# Patient Record
Sex: FEMALE | Race: WHITE | NOT HISPANIC OR LATINO | Employment: STUDENT | URBAN - METROPOLITAN AREA
[De-identification: names, ages, dates, MRNs, and addresses within clinical notes are randomized per-mention and may not be internally consistent; named-entity substitution may affect disease eponyms.]

---

## 2023-07-12 ENCOUNTER — OFFICE VISIT (OUTPATIENT)
Dept: OBSTETRICS AND GYNECOLOGY | Facility: CLINIC | Age: 23
End: 2023-07-12
Payer: OTHER GOVERNMENT

## 2023-07-12 VITALS — DIASTOLIC BLOOD PRESSURE: 70 MMHG | WEIGHT: 118.63 LBS | HEART RATE: 86 BPM | SYSTOLIC BLOOD PRESSURE: 108 MMHG

## 2023-07-12 DIAGNOSIS — R30.0 DYSURIA: ICD-10-CM

## 2023-07-12 DIAGNOSIS — N89.8 VAGINAL DISCHARGE: ICD-10-CM

## 2023-07-12 DIAGNOSIS — Z30.9 ENCOUNTER FOR CONTRACEPTIVE MANAGEMENT, UNSPECIFIED TYPE: ICD-10-CM

## 2023-07-12 DIAGNOSIS — Z01.419 WELL WOMAN EXAM WITH ROUTINE GYNECOLOGICAL EXAM: Primary | ICD-10-CM

## 2023-07-12 DIAGNOSIS — Z76.89 ENCOUNTER TO ESTABLISH CARE: ICD-10-CM

## 2023-07-12 PROCEDURE — 99385 PR PREVENTIVE VISIT,NEW,18-39: ICD-10-PCS | Mod: S$GLB,,,

## 2023-07-12 PROCEDURE — 99385 PREV VISIT NEW AGE 18-39: CPT | Mod: S$GLB,,,

## 2023-07-12 RX ORDER — ESCITALOPRAM OXALATE 5 MG/1
5 TABLET ORAL
COMMUNITY
Start: 2023-05-06

## 2023-07-12 RX ORDER — NORETHINDRONE ACETATE AND ETHINYL ESTRADIOL 1MG-20(21)
1 KIT ORAL DAILY
Qty: 30 TABLET | Refills: 11 | Status: SHIPPED | OUTPATIENT
Start: 2023-07-12 | End: 2024-07-11

## 2023-07-12 RX ORDER — FLUCONAZOLE 150 MG/1
150 TABLET ORAL EVERY OTHER DAY
Qty: 2 TABLET | Refills: 0 | Status: SHIPPED | OUTPATIENT
Start: 2023-07-12 | End: 2023-07-15

## 2023-07-12 RX ORDER — TINIDAZOLE 500 MG/1
2 TABLET ORAL
Qty: 8 TABLET | Refills: 0 | Status: SHIPPED | OUTPATIENT
Start: 2023-07-12 | End: 2023-07-14

## 2023-07-12 NOTE — PROGRESS NOTES
Subjective:      Patient ID: Foreign Rodrigues is a 22 y.o. female who presents for evaluation today.    Chief Complaint:    Well Woman, Vaginal Discharge (Pt states discharge is normal for her.), Vaginal odor, and Contraception (Pt would like to discuss birth control.)      History of Present Illness  Annual Exam (Premenopausal) - Patient presents for annual exam. The patient also has complaints of frequent UTIs, vaginal odor & discharge. The patient is sexually active. GYN screening history: no prior history of gyn screening tests. The patient wears seatbelts: yes. The patient participates in regular exercise: not asked. Has the patient ever been transfused or tattooed?: yes. The patient reports that there is not domestic violence in her life. Her periods are regular. She reports mood swings during her period & increased acne. She is on lexapro with Dr. Oconnor, doing well. She is interested in trying a birth control pill. She denies UTI symptoms today but has had frequent UTIs in the past.      GYN History  Patient's last menstrual period was 07/08/2023 (exact date).   Date of Last Pap: Pap smear completed today with screening for gonorrhea, chlamydia and trichomoniasis per CDC guidelines    VITALS  /70   Pulse 86   Wt 53.8 kg (118 lb 9.6 oz)   LMP 07/08/2023 (Exact Date)   Weight: 53.8 kg (118 lb 9.6 oz)         PAST MEDICAL HISTORY  Past Medical History:   Diagnosis Date    Anemia     Anxiety     Depression     Hormone disorder        PAST SURGICAL HISTORY  History reviewed. No pertinent surgical history.    SOCIAL HISTORY  Social History     Tobacco Use   Smoking Status Never   Smokeless Tobacco Never   ,   Social History     Substance and Sexual Activity   Alcohol Use Yes    Alcohol/week: 2.0 standard drinks    Types: 2 Glasses of wine per week        MEDICATIONS  No outpatient medications have been marked as taking for the 7/12/23 encounter (Office Visit) with Alisha Tan NP.         Review  of Systems   Review of Systems   Constitutional:  Negative for activity change, chills and fever.   Eyes:  Negative for visual disturbance.   Respiratory:  Negative for shortness of breath.    Cardiovascular:  Negative for chest pain.   Gastrointestinal:  Negative for abdominal pain, constipation, diarrhea, nausea and vomiting.   Genitourinary:  Positive for vaginal discharge. Negative for dysuria, flank pain, frequency, hematuria, menorrhagia, pelvic pain, urgency and vaginal bleeding.        No abnormal vaginal bleeding   Musculoskeletal:  Negative for back pain.   Integumentary:  Positive for acne. Negative for rash and breast mass.   Neurological:  Negative for numbness and headaches.   Psychiatric/Behavioral:          No mood disturbance or changes    Breast: Negative for mass        Objective:     Physical Exam:   Constitutional: She is oriented to person, place, and time. She appears well-developed. No distress.      Neck: No thyromegaly present.    Cardiovascular:  Normal rate and regular rhythm.             Pulmonary/Chest: Effort normal and breath sounds normal. No respiratory distress.        Abdominal: Soft. She exhibits no distension. There is no abdominal tenderness.     Genitourinary:    Vagina and uterus normal.      Pelvic exam was performed with patient supine.   The external female genitalia was normal.   Labial bartholins normal.There is no tenderness or lesion on the right labia. There is no tenderness or lesion on the left labia. Cervix is normal. Right adnexum displays no tenderness and no fullness. Left adnexum displays no tenderness and no fullness. Cervix exhibits no tenderness. Uterus is not enlarged and not tender.           Musculoskeletal: Moves all extremeties.       Neurological: She is alert and oriented to person, place, and time.    Skin: Skin is warm and dry.    Psychiatric: She has a normal mood and affect. Her behavior is normal.        Assessment:        1. Well woman exam with  routine gynecological exam    2. Encounter to establish care    3. Encounter for contraceptive management, unspecified type    4. Vaginal discharge    5. Dysuria       Well woman exam with routine gynecological exam    Encounter to establish care  -     Ambulatory referral/consult to Internal Medicine; Future; Expected date: 07/19/2023    Encounter for contraceptive management, unspecified type  -     norethindrone-ethinyl estradiol (JUNEL FE 1/20) 1 mg-20 mcg (21)/75 mg (7) per tablet; Take 1 tablet by mouth once daily.  Dispense: 30 tablet; Refill: 11  -     Cancel: POCT urine pregnancy  -     Pregnancy, urine rapid    Vaginal discharge  -     tinidazole (TINDAMAX) 500 MG tablet; Take 4 tablets (2 g total) by mouth daily with breakfast. for 2 days  Dispense: 8 tablet; Refill: 0  -     fluconazole (DIFLUCAN) 150 MG Tab; Take 1 tablet (150 mg total) by mouth every other day. for 2 doses  Dispense: 2 tablet; Refill: 0  -     BV PROFILE, SYMPTOMATIC    Dysuria  -     Cancel: POCT urine dipstick without microscope  -     Urinalysis, Reflex to Urine Culture Urine, Clean Catch; Future; Expected date: 07/12/2023       Plan:     Patient instructed to contact the clinic should any questions or concerns arise prior to her next office visit. Patient is happy with the plan of care at this time, verbalizes understanding and denies outstanding questions.      UA/UPT order sent to path lab  F/U in 3 mos for med check  Self-breast exams  Birth Control & safe sex practices discussed  If you don't hear from the office regarding results within 1 week, please call  Follow up in 1 year for annual or sooner as needed  Chaperone present for exam      The patient verbalizes desire to continue with current contraceptive regimen. The risks associated with oral contraceptive use, with special emphasis placed on the risk for stroke, blood clots, MI and death were reviewed with the patient. The patient was provided with the opportunity to ask  questions and verbalize concerns. Patient education was provided on the following aspects associated with oral contraceptive use:  Take the pill at the same time every day, set an alarm to serve as a reminder if necessary   If nausea or vomiting occurs when taking the medication, take with food or at bedtime  Missing doses may lead to breakthrough bleeding   Your periods may become lighter and/or shorter in duration than usual  If a pill is missed for one day, take 2 pills the next day  If two consecutive days are missed, take 2 pills for the next 2 days to catch up and finish the birth control pack. Utilize a backup method of contraception for the remainder of the pill pack.  Certain medications, especially antibiotics, may decrease the effectiveness of the pill  Oral contraceptives do not provide protection against sexually transmitted diseases  If any of the following symptoms are experiencing report to the nearest emergency room for further evaluation and discontinue the pill immediately.  Severe abdominal pain  Severe chest pain  Severe headache or paresthesia   Changes in vision  Severe leg pain or swelling  Shortness of breath and increased heart rate   Discussed by taking the pill, the patient consents to knowing and understanding the risks associated with oral contraceptive therapy. The patient verbalizes understanding of the components discussed and denies additional questions at this time.

## 2023-07-17 LAB
BACTERIAL VAGINOSIS: POSITIVE
CANDIDA GLABRATA: NEGATIVE
CANDIDA SPECIES: NEGATIVE
CHLAMYDIA: NEGATIVE
GONORRHEA: NEGATIVE
MYCOPLASMA GENITALIUM RESULT: NEGATIVE
TRICHOMONAS VAGINALIS: NEGATIVE

## 2023-07-18 ENCOUNTER — OFFICE VISIT (OUTPATIENT)
Dept: URGENT CARE | Facility: CLINIC | Age: 23
End: 2023-07-18
Payer: OTHER GOVERNMENT

## 2023-07-18 VITALS
WEIGHT: 118 LBS | RESPIRATION RATE: 16 BRPM | SYSTOLIC BLOOD PRESSURE: 102 MMHG | HEIGHT: 60 IN | DIASTOLIC BLOOD PRESSURE: 69 MMHG | TEMPERATURE: 99 F | HEART RATE: 69 BPM | BODY MASS INDEX: 23.16 KG/M2 | OXYGEN SATURATION: 99 %

## 2023-07-18 DIAGNOSIS — J02.9 SORE THROAT: ICD-10-CM

## 2023-07-18 DIAGNOSIS — J03.90 EXUDATIVE TONSILLITIS: Primary | ICD-10-CM

## 2023-07-18 DIAGNOSIS — J02.9 TONSILLOPHARYNGITIS: ICD-10-CM

## 2023-07-18 DIAGNOSIS — J35.1 TONSILLAR HYPERTROPHY: ICD-10-CM

## 2023-07-18 DIAGNOSIS — J03.91 ACUTE RECURRENT TONSILLITIS: ICD-10-CM

## 2023-07-18 LAB
CTP QC/QA: YES
CTP QC/QA: YES
HETEROPH AB SER QL: NEGATIVE
MOLECULAR STREP A: NEGATIVE

## 2023-07-18 PROCEDURE — 87651 STREP A DNA AMP PROBE: CPT | Mod: QW,,, | Performed by: NURSE PRACTITIONER

## 2023-07-18 PROCEDURE — 86308 HETEROPHILE ANTIBODY SCREEN: CPT | Mod: QW,,, | Performed by: NURSE PRACTITIONER

## 2023-07-18 PROCEDURE — 99499 NO LOS: ICD-10-PCS | Mod: S$GLB,,, | Performed by: NURSE PRACTITIONER

## 2023-07-18 PROCEDURE — 99499 UNLISTED E&M SERVICE: CPT | Mod: S$GLB,,, | Performed by: NURSE PRACTITIONER

## 2023-07-18 PROCEDURE — 87651 POCT STREP A MOLECULAR: ICD-10-PCS | Mod: QW,,, | Performed by: NURSE PRACTITIONER

## 2023-07-18 PROCEDURE — 86308 POCT INFECTIOUS MONONUCLEOSIS: ICD-10-PCS | Mod: QW,,, | Performed by: NURSE PRACTITIONER

## 2023-07-18 RX ORDER — PREDNISONE 20 MG/1
20 TABLET ORAL DAILY
Qty: 5 TABLET | Refills: 0 | Status: SHIPPED | OUTPATIENT
Start: 2023-07-18 | End: 2023-07-23

## 2023-07-18 RX ORDER — AMOXICILLIN 500 MG/1
500 TABLET, FILM COATED ORAL EVERY 12 HOURS
Qty: 20 TABLET | Refills: 0 | Status: SHIPPED | OUTPATIENT
Start: 2023-07-18 | End: 2023-07-28

## 2023-07-18 NOTE — PROGRESS NOTES
Subjective:      Patient ID: Foreign Rodrigues is a 22 y.o. female.    Vitals:  height is 5' (1.524 m) and weight is 53.5 kg (118 lb). Her temperature is 98.7 °F (37.1 °C). Her blood pressure is 102/69 and her pulse is 69. Her respiration is 16 and oxygen saturation is 99%.     Chief Complaint: Sore Throat    Patient complains of a sore throat, swollen/painful lymph nodes, fatigue,nausea, and decreased appetite x 1 week. She denies fever.  She reports being treated ~1 month ago for same complaint and completed 10 day course of unidentified antibiotic treatment. States her symptoms never fully resolved following completion of antibiotics and have worsened in the past week. No known exposure to strep pharyngitis or mononucleosis but does report known close contact with a friend who was recently diagnosed with a sinus infection.  She does admit to engaging in oral intercourse.  She also reports history of recurrent Strep C pharyngitis as a child.    Sore Throat   This is a new problem. The current episode started in the past 7 days. The problem has been waxing and waning. The pain is worse on the left side. There has been no fever. The pain is at a severity of 2/10. The pain is mild. Associated symptoms include congestion, coughing and swollen glands. Pertinent negatives include no abdominal pain, diarrhea, drooling, ear pain, headaches, shortness of breath, stridor, trouble swallowing or vomiting. She has had no exposure to strep or mono. Treatments tried: nyquil. The treatment provided mild relief.     Constitution: Positive for appetite change, chills and fatigue.   HENT:  Positive for congestion and sore throat. Negative for ear pain, drooling, postnasal drip, sinus pain, sinus pressure, trouble swallowing and voice change.    Neck: Positive for painful lymph nodes. Negative for neck stiffness and neck swelling.   Cardiovascular:  Negative for chest pain, palpitations and sob on exertion.   Respiratory:  Positive  for cough. Negative for shortness of breath, stridor and wheezing.         Infrequent dry cough   Gastrointestinal:  Positive for nausea. Negative for abdominal pain, vomiting and diarrhea.   Musculoskeletal:  Negative for pain, joint pain, joint swelling and abnormal ROM of joint.   Skin:  Negative for color change, pale, rash, lesion and erythema.   Neurological:  Negative for dizziness, light-headedness, passing out, headaches, disorientation and altered mental status.   Hematologic/Lymphatic: Positive for swollen lymph nodes.   Psychiatric/Behavioral:  Negative for altered mental status, disorientation, confusion and agitation.     Objective:     Physical Exam   Constitutional: She is oriented to person, place, and time.  Non-toxic appearance. She does not appear ill. No distress.   HENT:   Head: Normocephalic and atraumatic.   Ears:   Right Ear: Tympanic membrane normal.   Left Ear: Tympanic membrane normal.   Nose: Nose normal.   Mouth/Throat: Uvula is midline and mucous membranes are normal. Mucous membranes are moist. No oral lesions. No trismus in the jaw. No dental abscesses or uvula swelling. Oropharyngeal exudate and posterior oropharyngeal erythema present. No tonsillar abscesses. Tonsils are 2+ on the right. Tonsils are 3+ on the left. Tonsillar exudate.       Swallowing function intact.  No muffled voice sounds  No peritonsillar abscess is seen.      Comments: Swallowing function intact.  No muffled voice sounds  No peritonsillar abscess is seen.  Eyes: Conjunctivae are normal.   Neck: No neck rigidity present.   Cardiovascular: Normal rate, regular rhythm and normal heart sounds.   Pulmonary/Chest: Effort normal and breath sounds normal.   Abdominal: Normal appearance. Soft. flat abdomen   Musculoskeletal: Normal range of motion.         General: Normal range of motion.   Lymphadenopathy:        Head (right side): Submandibular adenopathy present.        Head (left side): Submandibular adenopathy  present.     She has cervical adenopathy.        Right cervical: Superficial cervical adenopathy present.        Left cervical: Superficial cervical adenopathy present.   + bilateral submandibular lymphadenopathy L>R   Neurological: no focal deficit. She is alert, oriented to person, place, and time and at baseline.   Skin: Skin is warm, dry, not diaphoretic, not pale and no rash. No erythema and No lesion   Psychiatric: Her behavior is normal. Mood, judgment and thought content normal.   Nursing note and vitals reviewed.    Assessment:     1. Exudative tonsillitis    2. Sore throat    3. Acute recurrent tonsillitis    4. Tonsillopharyngitis    5. Tonsillar hypertrophy        Plan:     Office Visit on 07/18/2023   Component Date Value Ref Range Status    Molecular Strep A, POC 07/18/2023 Negative  Negative Final     Acceptable 07/18/2023 Yes   Final    Monospot 07/18/2023 Negative  Negative Final     Acceptable 07/18/2023 Yes   Final       Exudative tonsillitis  -     POCT Infectious mononucleosis antibody  -     amoxicillin (AMOXIL) 500 MG Tab; Take 1 tablet (500 mg total) by mouth every 12 (twelve) hours. for 10 days  Dispense: 20 tablet; Refill: 0  -     diphenhydrAMINE-aluminum-magnesium hydroxide-simethicone-LIDOcaine HCl 2%; Swish and spit 15 mLs every 6 (six) hours as needed (throat pain).  Dispense: 180 each; Refill: 0  -     predniSONE (DELTASONE) 20 MG tablet; Take 1 tablet (20 mg total) by mouth once daily. for 5 days  Dispense: 5 tablet; Refill: 0    Sore throat  -     POCT Strep A, Molecular  -     POCT Infectious mononucleosis antibody  -     diphenhydrAMINE-aluminum-magnesium hydroxide-simethicone-LIDOcaine HCl 2%; Swish and spit 15 mLs every 6 (six) hours as needed (throat pain).  Dispense: 180 each; Refill: 0    Acute recurrent tonsillitis  -     POCT Infectious mononucleosis antibody  -     amoxicillin (AMOXIL) 500 MG Tab; Take 1 tablet (500 mg total) by mouth every 12  (twelve) hours. for 10 days  Dispense: 20 tablet; Refill: 0  -     diphenhydrAMINE-aluminum-magnesium hydroxide-simethicone-LIDOcaine HCl 2%; Swish and spit 15 mLs every 6 (six) hours as needed (throat pain).  Dispense: 180 each; Refill: 0    Tonsillopharyngitis  -     predniSONE (DELTASONE) 20 MG tablet; Take 1 tablet (20 mg total) by mouth once daily. for 5 days  Dispense: 5 tablet; Refill: 0    Tonsillar hypertrophy  -     predniSONE (DELTASONE) 20 MG tablet; Take 1 tablet (20 mg total) by mouth once daily. for 5 days  Dispense: 5 tablet; Refill: 0          Medical Decision Making:   History:   I obtained history from: someone other than patient.       <> Summary of History: Pharmacist provides health information related to pt's last antibiotic regimen which is pertinent to today's encounter and mgmt of condition.  Differential Diagnosis:   Differential Diagnosis includes, but is not limited to:  Gerry's angina, epiglottitis, foreign body aspiration, retropharyngeal abscess, peritonsillar abscess, esophageal perforation, parotitis, laryngitis, dental/periapical abscess, Streptococcal/bacterial pharyngitis, viral pharyngitis.   Clinical Tests:   Lab Tests: Reviewed       <> Summary of Lab: POCT Strep A (-)  POCT mono (-)  Urgent Care Management:  ** I called pts pharmacy to obtain information related to recent antibiotic treatment and was informed that her last Rx was written on 5/11/2023 for Amoxicillin 500 mg BID for 7 days.      The patient tested negative for strep A pharyngitis, however given her recent history of presumed strep pharyngitis and incomplete antibiotic therapy, I will discharge her home with oral antibiotics and steroids.  Discussed fever control with Motrin or Tylenol, the and to change tooth brush/avoidance of sharing eating utensils.Discussed strict ER precautions for any worsening of symptoms, they were able to verbalized understanding.      Patient Instructions   Please follow up with your  primary care provider within 2-5 days if your signs and symptoms have not resolved or worsen.     If your condition worsens or fails to improve we recommend that you receive another evaluation at the emergency room immediately or contact your primary medical clinic to discuss your concerns.   You must understand that you have received an Urgent Care treatment only and that you may be released before all of your medical problems are known or treated. You, the patient, will arrange for follow up care as instructed.      -Please take antibiotic to completion.  -Take tylenol/motrin as needed for pain/fever.    Please follow up with your primary care provider within 2-5 days if your signs and symptoms have not resolved or worsen.     If your condition worsens or fails to improve we recommend that you receive another evaluation at the emergency room immediately or contact your primary medical clinic to discuss your concerns.   You must understand that you have received an Urgent Care treatment only and that you may be released before all of your medical problems are known or treated. You, the patient, will arrange for follow up care as instructed.         Pharyngitis: Strep (Presumed)    You have pharyngitis (sore throat). The cause is thought to be the streptococcus, or strep, bacterium. Strep throat infection can cause throat pain that is worse when swallowing, aching all over, headache, and fever. The infection may be spread by coughing, kissing, or touching others after touching your mouth or nose. Antibiotic medications are given to treat the infection.  Home care  Rest at home. Drink plenty of fluids to avoid dehydration.  No work or school for the first 2 days of taking the antibiotics. After this time, you will not be contagious. You can then return to work or school if you are feeling better.   The antibiotic medication must be taken for the full 10 days, even if you feel better. This is very important to ensure  the infection is treated. It is also important to prevent drug-resistant organisms from developing. If you were given an antibiotic shot, no more antibiotics are needed.  You may use acetaminophen or ibuprofen to control pain or fever, unless another medicine was prescribed for this. If you have chronic liver or kidney disease or ever had a stomach ulcer or GI bleeding, talk with your doctor before using these medicines.  Throat lozenges or a throat-numbing sprays can help reduce throat pain. Gargling with warm salt water can also help. Dissolve 1/2 teaspoon of salt in 1 8 ounce glass of warm water.   Avoid salty or spicy foods, which can irritate the throat.  Follow-up care  Follow up with your healthcare provider or our staff if you are not improving over the next week.  When to seek medical advice  Call your healthcare provider right away if any of these occur:  Fever as directed by your doctor.   New or worsening ear pain, sinus pain, or headache  Painful lumps in the back of neck  Stiff neck  Lymph nodes are getting larger  Inability to swallow liquids, excessive drooling, or inability to open mouth wide due to throat pain  Signs of dehydration (very dark urine or no urine, sunken eyes, dizziness)  Trouble breathing or noisy breathing  Muffled voice  New rash  Date Last Reviewed: 4/13/2015  © 5145-0394 [employer]. [address] [place] [address], [place] [address]. All rights reserved. This information is not intended as a substitute for professional medical care. Always follow your healthcare professional's instructions.

## 2023-07-18 NOTE — PATIENT INSTRUCTIONS
Please follow up with your primary care provider within 2-5 days if your signs and symptoms have not resolved or worsen.     If your condition worsens or fails to improve we recommend that you receive another evaluation at the emergency room immediately or contact your primary medical clinic to discuss your concerns.   You must understand that you have received an Urgent Care treatment only and that you may be released before all of your medical problems are known or treated. You, the patient, will arrange for follow up care as instructed.      -Please take antibiotic to completion.  -Take tylenol/motrin as needed for pain/fever.    Please follow up with your primary care provider within 2-5 days if your signs and symptoms have not resolved or worsen.     If your condition worsens or fails to improve we recommend that you receive another evaluation at the emergency room immediately or contact your primary medical clinic to discuss your concerns.   You must understand that you have received an Urgent Care treatment only and that you may be released before all of your medical problems are known or treated. You, the patient, will arrange for follow up care as instructed.         Pharyngitis: Strep (Presumed)    You have pharyngitis (sore throat). The cause is thought to be the streptococcus, or strep, bacterium. Strep throat infection can cause throat pain that is worse when swallowing, aching all over, headache, and fever. The infection may be spread by coughing, kissing, or touching others after touching your mouth or nose. Antibiotic medications are given to treat the infection.  Home care  Rest at home. Drink plenty of fluids to avoid dehydration.  No work or school for the first 2 days of taking the antibiotics. After this time, you will not be contagious. You can then return to work or school if you are feeling better.   The antibiotic medication must be taken for the full 10 days, even if you feel better. This  is very important to ensure the infection is treated. It is also important to prevent drug-resistant organisms from developing. If you were given an antibiotic shot, no more antibiotics are needed.  You may use acetaminophen or ibuprofen to control pain or fever, unless another medicine was prescribed for this. If you have chronic liver or kidney disease or ever had a stomach ulcer or GI bleeding, talk with your doctor before using these medicines.  Throat lozenges or a throat-numbing sprays can help reduce throat pain. Gargling with warm salt water can also help. Dissolve 1/2 teaspoon of salt in 1 8 ounce glass of warm water.   Avoid salty or spicy foods, which can irritate the throat.  Follow-up care  Follow up with your healthcare provider or our staff if you are not improving over the next week.  When to seek medical advice  Call your healthcare provider right away if any of these occur:  Fever as directed by your doctor.   New or worsening ear pain, sinus pain, or headache  Painful lumps in the back of neck  Stiff neck  Lymph nodes are getting larger  Inability to swallow liquids, excessive drooling, or inability to open mouth wide due to throat pain  Signs of dehydration (very dark urine or no urine, sunken eyes, dizziness)  Trouble breathing or noisy breathing  Muffled voice  New rash  Date Last Reviewed: 4/13/2015  © 2449-7041 [employer]. [address] [place] [address], [place] [address]. All rights reserved. This information is not intended as a substitute for professional medical care. Always follow your healthcare professional's instructions.

## 2023-07-26 ENCOUNTER — TELEPHONE (OUTPATIENT)
Dept: URGENT CARE | Facility: CLINIC | Age: 23
End: 2023-07-26
Payer: OTHER GOVERNMENT

## 2023-08-09 ENCOUNTER — TELEPHONE (OUTPATIENT)
Dept: URGENT CARE | Facility: CLINIC | Age: 23
End: 2023-08-09
Payer: OTHER GOVERNMENT

## 2023-08-09 NOTE — TELEPHONE ENCOUNTER
Pt called stating that she is still having an ongoing sore throat. Pt stated that she does not have a PCP and was prescribed amoxicillin 500 mg but did not stay consistent upon taking and did not finish they instructed dose. Pt was informed that she would need to come in to be re-evaluated for any worsening/new symptoms.

## 2023-08-11 ENCOUNTER — PATIENT MESSAGE (OUTPATIENT)
Dept: OBSTETRICS AND GYNECOLOGY | Facility: CLINIC | Age: 23
End: 2023-08-11
Payer: OTHER GOVERNMENT

## 2023-08-14 ENCOUNTER — OFFICE VISIT (OUTPATIENT)
Dept: URGENT CARE | Facility: CLINIC | Age: 23
End: 2023-08-14
Payer: OTHER GOVERNMENT

## 2023-08-14 VITALS
WEIGHT: 118 LBS | TEMPERATURE: 98 F | HEART RATE: 75 BPM | HEIGHT: 60 IN | SYSTOLIC BLOOD PRESSURE: 116 MMHG | BODY MASS INDEX: 23.16 KG/M2 | RESPIRATION RATE: 17 BRPM | OXYGEN SATURATION: 100 % | DIASTOLIC BLOOD PRESSURE: 77 MMHG

## 2023-08-14 DIAGNOSIS — J34.89 POSTERIOR RHINORRHEA: ICD-10-CM

## 2023-08-14 DIAGNOSIS — J02.9 PHARYNGITIS, UNSPECIFIED ETIOLOGY: Primary | ICD-10-CM

## 2023-08-14 DIAGNOSIS — J30.9 ALLERGIC RHINITIS, UNSPECIFIED SEASONALITY, UNSPECIFIED TRIGGER: ICD-10-CM

## 2023-08-14 PROCEDURE — 99499 UNLISTED E&M SERVICE: CPT | Mod: S$GLB,,, | Performed by: NURSE PRACTITIONER

## 2023-08-14 PROCEDURE — 99499 NO LOS: ICD-10-PCS | Mod: S$GLB,,, | Performed by: NURSE PRACTITIONER

## 2023-08-14 NOTE — PATIENT INSTRUCTIONS
Please follow up with your primary care provider within 2-5 days if your signs and symptoms have not resolved or worsen.     If your condition worsens or fails to improve we recommend that you receive another evaluation at the emergency room immediately or contact your primary medical clinic to discuss your concerns.   You must understand that you have received an Urgent Care treatment only and that you may be released before all of your medical problems are known or treated. You, the patient, will arrange for follow up care as instructed.     Start daily antihistamine such as Claritin, Zyrtec, or xyzal at bedtime    If you are unable to  Magic Mouthwash from pharmacy:  1:1 mixture of Benadryl 12.5 mg/5ml, Maalox, and Tyelnol 160 mg/5ml  Equal parts mixture 1 1/2 tsp every 4-6 hours as needed for throat pain. Store in refrigerator

## 2023-08-14 NOTE — PROGRESS NOTES
Subjective:      Patient ID: Foreign Rodrigues is a 22 y.o. female.    Vitals:  height is 5' (1.524 m) and weight is 53.5 kg (118 lb). Her oral temperature is 98.4 °F (36.9 °C). Her blood pressure is 116/77 and her pulse is 75. Her respiration is 17 and oxygen saturation is 100%.     Chief Complaint: Sore Throat    Patient is returning for sore throat from a Tonsillitis diagnosis from 3 moths ago, patient came to urgent care for these symptoms one month ago, with medications felt relief and now feels they symptoms returning.     Reports recurrence of sore throat x 2 weeks. Denies fever, difficulty swallowing.  + postnasal drip    Sore Throat   This is a recurrent problem. The current episode started more than 1 month ago. The problem has been unchanged. Neither side of throat is experiencing more pain than the other. There has been no fever. The pain is at a severity of 1/10. The pain is mild. Associated symptoms include coughing and swollen glands. Pertinent negatives include no abdominal pain, congestion, diarrhea, drooling, ear discharge, ear pain, headaches, hoarse voice, plugged ear sensation, neck pain, shortness of breath, stridor, trouble swallowing or vomiting. She has had no exposure to strep or mono. The treatment provided no relief.       Constitution: Negative for activity change, appetite change, chills, sweating, fatigue, fever and generalized weakness.   HENT:  Positive for postnasal drip and sore throat. Negative for ear pain, ear discharge, drooling, tongue pain, tongue lesion, congestion, sinus pain, sinus pressure, trouble swallowing and voice change.    Neck: Negative for neck pain, neck stiffness and painful lymph nodes.   Cardiovascular:  Negative for chest pain, leg swelling, palpitations, sob on exertion and passing out.   Eyes:  Negative for eye discharge, eye itching, eye pain and eye redness.   Respiratory:  Positive for cough. Negative for chest tightness, shortness of breath and  stridor.         Occasional dry cough   Gastrointestinal:  Negative for abdominal pain, nausea, vomiting and diarrhea.   Musculoskeletal:  Negative for pain, trauma, joint pain and joint swelling.   Skin:  Negative for color change, pale and lesion.   Neurological:  Negative for dizziness, light-headedness, passing out, headaches, disorientation and altered mental status.   Hematologic/Lymphatic: Negative for swollen lymph nodes.   Psychiatric/Behavioral:  Negative for altered mental status, disorientation, confusion and agitation.       Objective:     Physical Exam   Constitutional: She is oriented to person, place, and time.  Non-toxic appearance. She does not appear ill. No distress.   HENT:   Head: Normocephalic.   Nose: Nose normal.   Mouth/Throat: Uvula is midline and mucous membranes are normal. Mucous membranes are moist. No oral lesions. No uvula swelling. Posterior oropharyngeal erythema and cobblestoning present. No oropharyngeal exudate. No tonsillar exudate.   +posterior pharyngeal erythema without lesions, exudate, or edema  +Posterior pharyngeal rhinorrhea          Comments: +posterior pharyngeal erythema without lesions, exudate, or edema  +Posterior pharyngeal rhinorrhea      Eyes: Conjunctivae are normal. Extraocular movement intact   Cardiovascular: Normal rate, regular rhythm, normal heart sounds and normal pulses.   Pulmonary/Chest: Effort normal and breath sounds normal.   Abdominal: Normal appearance.   Musculoskeletal: Normal range of motion.         General: Normal range of motion.   Neurological: no focal deficit. She is alert, oriented to person, place, and time and at baseline.   Skin: Skin is warm, dry and not diaphoretic.   Psychiatric: Her behavior is normal. Mood, judgment and thought content normal.   Nursing note and vitals reviewed.      Assessment:     1. Pharyngitis, unspecified etiology    2. Allergic rhinitis, unspecified seasonality, unspecified trigger    3. Posterior  rhinorrhea        Plan:       Pharyngitis, unspecified etiology  -     diphenhydrAMINE-aluminum-magnesium hydroxide-simethicone-LIDOcaine HCl 2%; Swish and spit 15 mLs every 4 (four) hours as needed.  Dispense: 180 each; Refill: 0    Allergic rhinitis, unspecified seasonality, unspecified trigger  -     diphenhydrAMINE-aluminum-magnesium hydroxide-simethicone-LIDOcaine HCl 2%; Swish and spit 15 mLs every 4 (four) hours as needed.  Dispense: 180 each; Refill: 0    Posterior rhinorrhea  -     diphenhydrAMINE-aluminum-magnesium hydroxide-simethicone-LIDOcaine HCl 2%; Swish and spit 15 mLs every 4 (four) hours as needed.  Dispense: 180 each; Refill: 0      Patient Instructions   Please follow up with your primary care provider within 2-5 days if your signs and symptoms have not resolved or worsen.     If your condition worsens or fails to improve we recommend that you receive another evaluation at the emergency room immediately or contact your primary medical clinic to discuss your concerns.   You must understand that you have received an Urgent Care treatment only and that you may be released before all of your medical problems are known or treated. You, the patient, will arrange for follow up care as instructed.     Start daily antihistamine such as Claritin, Zyrtec, or xyzal at bedtime    If you are unable to  Magic Mouthwash from pharmacy:  1:1 mixture of Benadryl 12.5 mg/5ml, Maalox, and Tyelnol 160 mg/5ml  Equal parts mixture 1 1/2 tsp every 4-6 hours as needed for throat pain. Store in refrigerator

## 2023-08-21 ENCOUNTER — OFFICE VISIT (OUTPATIENT)
Dept: PRIMARY CARE CLINIC | Facility: CLINIC | Age: 23
End: 2023-08-21
Payer: OTHER GOVERNMENT

## 2023-08-21 VITALS
BODY MASS INDEX: 23.16 KG/M2 | DIASTOLIC BLOOD PRESSURE: 69 MMHG | WEIGHT: 118 LBS | SYSTOLIC BLOOD PRESSURE: 99 MMHG | OXYGEN SATURATION: 98 % | HEART RATE: 71 BPM | HEIGHT: 60 IN | RESPIRATION RATE: 18 BRPM

## 2023-08-21 DIAGNOSIS — Z13.220 SCREENING CHOLESTEROL LEVEL: ICD-10-CM

## 2023-08-21 DIAGNOSIS — R35.0 URINE FREQUENCY: ICD-10-CM

## 2023-08-21 DIAGNOSIS — Z13.29 THYROID DISORDER SCREEN: ICD-10-CM

## 2023-08-21 DIAGNOSIS — Z11.59 NEED FOR HEPATITIS C SCREENING TEST: ICD-10-CM

## 2023-08-21 DIAGNOSIS — F41.9 ANXIETY: ICD-10-CM

## 2023-08-21 DIAGNOSIS — L70.0 ACNE VULGARIS: ICD-10-CM

## 2023-08-21 DIAGNOSIS — Z11.4 SCREENING FOR HIV (HUMAN IMMUNODEFICIENCY VIRUS): ICD-10-CM

## 2023-08-21 DIAGNOSIS — Z00.00 ANNUAL PHYSICAL EXAM: ICD-10-CM

## 2023-08-21 DIAGNOSIS — E55.9 VITAMIN D DEFICIENCY: ICD-10-CM

## 2023-08-21 DIAGNOSIS — Z76.89 ENCOUNTER TO ESTABLISH CARE: Primary | ICD-10-CM

## 2023-08-21 PROCEDURE — 99395 PREV VISIT EST AGE 18-39: CPT | Mod: S$GLB,,, | Performed by: NURSE PRACTITIONER

## 2023-08-21 PROCEDURE — 99395 PR PREVENTIVE VISIT,EST,18-39: ICD-10-PCS | Mod: S$GLB,,, | Performed by: NURSE PRACTITIONER

## 2023-08-21 RX ORDER — DOXYCYCLINE 100 MG/1
100 CAPSULE ORAL 2 TIMES DAILY
Qty: 20 CAPSULE | Refills: 0 | Status: SHIPPED | OUTPATIENT
Start: 2023-08-21 | End: 2023-08-31

## 2023-08-21 NOTE — PATIENT INSTRUCTIONS
RTC in 3 months for F/U or sooner if needed.    Keep appts with specialists as scheduled.    Patient Education       Yearly Physical for Adults   About this topic   Most people do not want to be sick. Having a checkup each year with your doctor is one way to help you stay healthy. You may need to see your doctor more or less often. How often you need to go to the doctor depends on your age. Your family and medical history also play a role in how often you need to go to the doctor. Going to see your doctor on a routine basis can help you find problems early or even before they start. This may make it easier to treat or cure your problem.  General   Your doctor will talk about many things during your checkup. Your doctor may ask about:  Your medical and family history.  All the drugs you are taking. Be sure to include all prescription, over the counter, and herbal supplements. Tell the doctor if you have any drug allergy. Bring a list of drugs you take with you.  How you are feeling and if you are having any problems.  Risky behaviors like smoking, drinking alcohol, using illegal drugs, not wearing seatbelts, having unprotected sex, etc.  Your doctor will do a physical exam and may check your:  Height and weight  Blood pressure  Reflexes  Memory  Vision  Hearing  Your doctor may order:  Lab tests  ECG to check your heart rhythm  X-rays  Tests or treatments based on your exam  What lifestyle changes are needed?   Your doctor may suggest you make changes to your lifestyle at this visit. The doctor may talk with you about being more active or lowering stress levels. Ask your doctor what you need to do.  What drugs may be needed?   Your doctor may order drugs or vaccines to protect you from illnesses.  What changes to diet are needed?   Talk to your doctor to see if any changes are needed to your diet.  When do I need to call the doctor?   Call your doctor if you need to learn about any test results. Together you can make  a plan for more care.  Helpful tips   Make a list of questions for your doctor before you go. This will help you remember to ask about any concerns. Write down any answers from your doctor so you can look over them after your visit.   Tell your doctor about any changes in your body or health since your last visit.  Ask your doctor about any screening tests you need.  Where can I learn more?   American Academy [employer]icians  http://familydoctor.org/familydoctor/en/prevention-wellness/staying-healthy/healthy-living/preventive-services-for-healthy-living.printerview.html   Centers for Disease Control  http://www.cdc.gov/family/checkup/   Last Reviewed Date   2019-04-22  Consumer Information Use and Disclaimer   This information is not specific medical advice and does not replace information you receive from your health care provider. This is only a brief summary of general information. It does NOT include all information about conditions, illnesses, injuries, tests, procedures, treatments, therapies, discharge instructions or life-style choices that may apply to you. You must talk with your health care provider for complete information about your health and treatment options. This information should not be used to decide whether or not to accept your health care providers advice, instructions or recommendations. Only your health care provider has the knowledge and training to provide advice that is right for you.  Copyright   Copyright © 2021 [employer] and its affiliates and/or licensors. All rights reserved.

## 2023-08-21 NOTE — PROGRESS NOTES
Subjective:       Patient ID: Foreign Rodrigues is a 22 y.o. female.    Chief Complaint: Establish Care    HPI: Foreign is a 22 y.o. female who presents to establish care with a new PCP. Previously seen by     Followed by Alisha Tan for GYN care which she had her first appt to establish care in July. Menstrual cycles normal but has changed slightly since starting birth control pills daily. Next appt is in October.    History of anxiety and depression which is controlled with lexapro 5 mg daily. Denies SI/HI.    History of acne which she is hoping the birth control helps with. She has noticed a flare up and inflammation of her acne the past months.    Denies smoking or drinking.            Past Medical History:   Diagnosis Date    Anemia     Anxiety     Depression     Hormone disorder        History reviewed. No pertinent surgical history.    Family History   Problem Relation Age of Onset    Prostate cancer Paternal Grandfather     Prostate cancer Father        Social History     Tobacco Use    Smoking status: Never    Smokeless tobacco: Never   Substance Use Topics    Alcohol use: Yes     Alcohol/week: 2.0 standard drinks of alcohol     Types: 2 Glasses of wine per week    Drug use: Never       Patient Active Problem List   Diagnosis    Anxiety    Acne vulgaris         There is no immunization history on file for this patient.        Review of Systems   Constitutional:  Negative for activity change, appetite change, chills, diaphoresis, fatigue and fever.   HENT:  Negative for congestion, ear pain, sinus pain, tinnitus and trouble swallowing.    Eyes:  Negative for visual disturbance.   Respiratory:  Negative for cough, chest tightness, shortness of breath and wheezing.    Cardiovascular:  Negative for chest pain, palpitations and leg swelling.   Gastrointestinal:  Negative for abdominal distention, abdominal pain, blood in stool, constipation, diarrhea, nausea and vomiting.   Endocrine: Negative for cold  intolerance, heat intolerance, polydipsia, polyphagia and polyuria.   Genitourinary:  Negative for decreased urine volume, dysuria, frequency, hematuria and urgency.   Musculoskeletal:  Negative for gait problem, neck pain and neck stiffness.   Skin:  Negative for color change and rash.        Acne to face   Neurological:  Negative for dizziness, syncope, weakness, light-headedness, numbness and headaches.   Hematological:  Negative for adenopathy. Does not bruise/bleed easily.   Psychiatric/Behavioral:  Negative for behavioral problems, confusion, dysphoric mood and suicidal ideas. The patient is not nervous/anxious.      Objective:     Vitals:    08/21/23 1456   BP: 99/69   BP Location: Right arm   Patient Position: Sitting   BP Method: Medium (Automatic)   Pulse: 71   Resp: 18   SpO2: 98%   Weight: 53.5 kg (118 lb)   Height: 5' (1.524 m)       Physical Exam  Vitals and nursing note reviewed.   Constitutional:       General: She is not in acute distress.     Appearance: Normal appearance. She is not diaphoretic.   HENT:      Head: Normocephalic and atraumatic.      Nose: Nose normal.      Mouth/Throat:      Mouth: Mucous membranes are moist.      Pharynx: Oropharynx is clear.   Eyes:      Extraocular Movements: Extraocular movements intact.      Conjunctiva/sclera: Conjunctivae normal.      Pupils: Pupils are equal, round, and reactive to light.   Neck:      Thyroid: No thyromegaly or thyroid tenderness.   Cardiovascular:      Rate and Rhythm: Normal rate and regular rhythm.      Pulses: Normal pulses.      Heart sounds: Normal heart sounds.   Pulmonary:      Effort: Pulmonary effort is normal.      Breath sounds: Normal breath sounds. No stridor. No wheezing or rales.   Abdominal:      General: Bowel sounds are normal. There is no distension.      Palpations: Abdomen is soft.      Tenderness: There is no abdominal tenderness.   Musculoskeletal:         General: No tenderness. Normal range of motion.      Cervical  back: Normal range of motion and neck supple.      Right lower leg: No edema.      Left lower leg: No edema.   Lymphadenopathy:      Cervical: No cervical adenopathy.   Skin:     General: Skin is warm and dry.      Capillary Refill: Capillary refill takes less than 2 seconds.      Comments: Acne to face - chronic   Neurological:      Mental Status: She is alert and oriented to person, place, and time.   Psychiatric:         Mood and Affect: Mood and affect normal.         Behavior: Behavior normal. Behavior is cooperative.         Thought Content: Thought content normal.         Judgment: Judgment normal.         Office Visit on 07/18/2023   Component Date Value Ref Range Status    Molecular Strep A, POC 07/18/2023 Negative  Negative Final     Acceptable 07/18/2023 Yes   Final    Monospot 07/18/2023 Negative  Negative Final     Acceptable 07/18/2023 Yes   Final   Office Visit on 07/12/2023   Component Date Value Ref Range Status    Candida glabrata 07/12/2023 NEGATIVE  NEGATIVE Final    ADELINA SPECIES 07/12/2023 NEGATIVE  NEGATIVE Final    BACTERIAL VAGINOSIS 07/12/2023 POSITIVE (A)  NEGATIVE Final    Trichomonas vaginalis 07/12/2023 NEGATIVE  NEGATIVE Final    Chlamydia 07/12/2023 NEGATIVE  NEGATIVE Final    Gonorrhea 07/12/2023 NEGATIVE  NEGATIVE Final    Mycoplasma Genitalium Result 07/12/2023 NEGATIVE  NEGATIVE Final         Assessment:      1. Encounter to establish care    2. Annual physical exam    3. Urine frequency    4. Screening cholesterol level    5. Thyroid disorder screen    6. Screening for HIV (human immunodeficiency virus)    7. Need for hepatitis C screening test    8. Anxiety Well controlled   9. Vitamin D deficiency    10. Acne vulgaris          Plan:     Encounter to establish care  Comments:  Will review labs and determine POC based on results  Orders:  -     Ambulatory referral/consult to Internal Medicine    Annual physical exam  Comments:  Will review labs and  determine POC based on results  Orders:  -     CBC Auto Differential; Future; Expected date: 08/21/2023  -     Comprehensive Metabolic Panel; Future; Expected date: 08/21/2023  -     Lipid Panel; Future; Expected date: 08/21/2023  -     TSH; Future; Expected date: 08/21/2023    Urine frequency  -     Urinalysis, Reflex to Urine Culture Urine, Clean Catch; Future; Expected date: 08/21/2023    Screening cholesterol level  -     Lipid Panel; Future; Expected date: 08/21/2023    Thyroid disorder screen  -     T4, Free; Future; Expected date: 08/21/2023  -     TSH; Future; Expected date: 08/21/2023    Screening for HIV (human immunodeficiency virus)  -     HIV 1/2 Ag/Ab (4th Gen); Future; Expected date: 08/21/2023    Need for hepatitis C screening test  -     Hepatitis C Antibody; Future; Expected date: 08/21/2023    Anxiety  Comments:  continue lexapro 5 mg daily    Vitamin D deficiency  -     Vitamin D; Future; Expected date: 08/21/2023    Acne vulgaris  Comments:  Doxycycline ordered  Orders:  -     doxycycline (MONODOX) 100 MG capsule; Take 1 capsule (100 mg total) by mouth 2 (two) times a day. for 10 days  Dispense: 20 capsule; Refill: 0         Current Outpatient Medications   Medication Sig Dispense Refill    EScitalopram oxalate (LEXAPRO) 5 MG Tab Take 5 mg by mouth.      norethindrone-ethinyl estradiol (JUNEL FE 1/20) 1 mg-20 mcg (21)/75 mg (7) per tablet Take 1 tablet by mouth once daily. 30 tablet 11    doxycycline (MONODOX) 100 MG capsule Take 1 capsule (100 mg total) by mouth 2 (two) times a day. for 10 days 20 capsule 0     No current facility-administered medications for this visit.       Medications Discontinued During This Encounter   Medication Reason    diphenhydrAMINE-aluminum-magnesium hydroxide-simethicone-LIDOcaine HCl 2% Patient no longer taking    diphenhydrAMINE-aluminum-magnesium hydroxide-simethicone-LIDOcaine HCl 2% Patient no longer taking       Health Maintenance   Topic Date Due     Hepatitis C Screening  Never done    Lipid Panel  Never done    HPV Vaccines (1 - 2-dose series) Never done    Chlamydia Screening  Never done    TETANUS VACCINE  Never done       Patient Instructions   RTC in 3 months for F/U or sooner if needed.    Keep appts with specialists as scheduled.    Patient Education       Yearly Physical for Adults   About this topic   Most people do not want to be sick. Having a checkup each year with your doctor is one way to help you stay healthy. You may need to see your doctor more or less often. How often you need to go to the doctor depends on your age. Your family and medical history also play a role in how often you need to go to the doctor. Going to see your doctor on a routine basis can help you find problems early or even before they start. This may make it easier to treat or cure your problem.  General   Your doctor will talk about many things during your checkup. Your doctor may ask about:  Your medical and family history.  All the drugs you are taking. Be sure to include all prescription, over the counter, and herbal supplements. Tell the doctor if you have any drug allergy. Bring a list of drugs you take with you.  How you are feeling and if you are having any problems.  Risky behaviors like smoking, drinking alcohol, using illegal drugs, not wearing seatbelts, having unprotected sex, etc.  Your doctor will do a physical exam and may check your:  Height and weight  Blood pressure  Reflexes  Memory  Vision  Hearing  Your doctor may order:  Lab tests  ECG to check your heart rhythm  X-rays  Tests or treatments based on your exam  What lifestyle changes are needed?   Your doctor may suggest you make changes to your lifestyle at this visit. The doctor may talk with you about being more active or lowering stress levels. Ask your doctor what you need to do.  What drugs may be needed?   Your doctor may order drugs or vaccines to protect you from illnesses.  What changes to diet  are needed?   Talk to your doctor to see if any changes are needed to your diet.  When do I need to call the doctor?   Call your doctor if you need to learn about any test results. Together you can make a plan for more care.  Helpful tips   Make a list of questions for your doctor before you go. This will help you remember to ask about any concerns. Write down any answers from your doctor so you can look over them after your visit.   Tell your doctor about any changes in your body or health since your last visit.  Ask your doctor about any screening tests you need.  Where can I learn more?   American Academy [employer] Family [employer]icians  http://familydoctor.org/familydoctor/en/prevention-wellness/staying-healthy/healthy-living/preventive-services-for-healthy-living.printerview.html   Centers for Disease Control  http://www.cdc.gov/family/checkup/   Last Reviewed Date   2019-04-22  Consumer Information Use and Disclaimer   This information is not specific medical advice and does not replace information you receive from your health care provider. This is only a brief summary of general information. It does NOT include all information about conditions, illnesses, injuries, tests, procedures, treatments, therapies, discharge instructions or life-style choices that may apply to you. You must talk with your health care provider for complete information about your health and treatment options. This information should not be used to decide whether or not to accept your health care providers advice, instructions or recommendations. Only your health care provider has the knowledge and training to provide advice that is right for you.  Copyright   Copyright © 2021 [employer] and its affiliates and/or licensors. All rights reserved.      Risks, benefits, and alternatives discussed with patient, Patient verbalized understanding of discussed plan of care. Asked patient if any further questions, answered no.    Future Appointments    Date Time Provider Department Center   10/25/2023 10:00 AM Alisha Tan NP [id number]   11/29/2023  9:20 AM Porsha Felix NP [id number] NP

## 2023-08-22 ENCOUNTER — CLINICAL SUPPORT (OUTPATIENT)
Dept: OBSTETRICS AND GYNECOLOGY | Facility: CLINIC | Age: 23
End: 2023-08-22
Payer: OTHER GOVERNMENT

## 2023-08-22 DIAGNOSIS — Z01.89 ROUTINE LAB DRAW: Primary | ICD-10-CM

## 2023-08-22 LAB
25(OH)D3 SERPL-MCNC: 32 NG/ML
ABS NRBC COUNT: 0 THOU/UL (ref 0–0.01)
ABSOLUTE BASOPHIL: 0 10*3/UL (ref 0–0.3)
ABSOLUTE EOSINOPHIL: 0.1 10*3/UL (ref 0–0.6)
ABSOLUTE IMMATURE GRAN: 0.01 THOU/UL (ref 0–0.03)
ABSOLUTE LYMPHOCYTE: 2 10*3/UL (ref 1.2–4)
ABSOLUTE MONOCYTE: 0.3 10*3/UL (ref 0.1–0.8)
ALBUMIN SERPL BCP-MCNC: 4 G/DL (ref 3.4–5)
ALP SERPL-CCNC: 36 U/L (ref 45–117)
ALT SERPL W P-5'-P-CCNC: 19 U/L (ref 13–56)
ANION GAP SERPL CALC-SCNC: 10 MMOL/L (ref 3–11)
AST SERPL-CCNC: 17 U/L (ref 15–37)
BASOPHILS NFR BLD: 0.6 % (ref 0–3)
BILIRUB SERPL-MCNC: 0.3 MG/DL (ref 0.2–1)
BUN SERPL-MCNC: 11 MG/DL (ref 7–18)
BUN/CREAT SERPL: 13.58 RATIO
CALCIUM SERPL-MCNC: 9.5 MG/DL (ref 8.5–10.1)
CHLORIDE SERPL-SCNC: 108 MMOL/L (ref 98–107)
CHOLEST SERPL-MSCNC: 153 MG/DL
CO2 SERPL-SCNC: 23 MMOL/L (ref 21–32)
CREAT SERPL-MCNC: 0.81 MG/DL (ref 0.55–1.02)
EOSINOPHIL NFR BLD: 2.4 % (ref 0–6)
ERYTHROCYTE [DISTWIDTH] IN BLOOD BY AUTOMATED COUNT: 12.8 % (ref 0–15.5)
GFR ESTIMATION: > 60
GLUCOSE SERPL-MCNC: 82 MG/DL (ref 74–106)
HCT VFR BLD AUTO: 37.5 % (ref 37–47)
HCV AB SERPL QL IA: NONREACTIVE
HDLC SERPL-MCNC: 62 MG/DL
HGB BLD-MCNC: 12.5 G/DL (ref 12–16)
HIV-1 AND HIV-2 ANTIBODIES: NEGATIVE
IMMATURE GRANULOCYTES: 0.2 % (ref 0–0.43)
LDLC SERPL CALC-MCNC: 72.4 MG/DL
LYMPHOCYTES NFR BLD: 42.2 % (ref 20–45)
MCH RBC QN AUTO: 29.3 PG (ref 27–32)
MCHC RBC AUTO-ENTMCNC: 33.3 % (ref 32–36)
MCV RBC AUTO: 88 FL (ref 80–99)
MONOCYTES NFR BLD: 6.6 % (ref 2–10)
NEUTROPHILS # BLD AUTO: 2.2 10*3/UL (ref 1.4–7)
NEUTROPHILS NFR BLD: 48 % (ref 50–80)
NUCLEATED RED BLOOD CELLS: 0 % (ref 0–0.2)
PLATELETS: 236 10*3/UL (ref 130–400)
PMV BLD AUTO: 11 FL (ref 9.2–12.2)
POTASSIUM SERPL-SCNC: 4.4 MMOL/L (ref 3.5–5.1)
PROT SERPL-MCNC: 7.8 G/DL (ref 6.4–8.2)
RBC # BLD AUTO: 4.26 10*6/UL (ref 4.2–5.4)
SODIUM BLD-SCNC: 141 MMOL/L (ref 131–143)
T4 FREE SP9 P CHAL SERPL-SCNC: 0.95 NG/DL (ref 0.76–1.46)
TRIGL SERPL-MCNC: 93 MG/DL (ref 0–149)
TSH SERPL DL<=0.005 MIU/L-ACNC: 2.59 UIU/ML (ref 0.46–3.98)
VLDL CHOLESTEROL: 19 MG/DL
WBC # BLD: 4.7 10*3/UL (ref 4.5–10)

## 2023-08-22 PROCEDURE — 36415 PR COLLECTION VENOUS BLOOD,VENIPUNCTURE: ICD-10-PCS | Mod: S$GLB,,, | Performed by: NURSE PRACTITIONER

## 2023-08-22 PROCEDURE — 36415 COLL VENOUS BLD VENIPUNCTURE: CPT | Mod: S$GLB,,, | Performed by: NURSE PRACTITIONER

## 2024-07-29 ENCOUNTER — PATIENT MESSAGE (OUTPATIENT)
Dept: OBSTETRICS AND GYNECOLOGY | Facility: CLINIC | Age: 24
End: 2024-07-29
Payer: OTHER GOVERNMENT